# Patient Record
Sex: FEMALE | Race: BLACK OR AFRICAN AMERICAN | URBAN - NONMETROPOLITAN AREA
[De-identification: names, ages, dates, MRNs, and addresses within clinical notes are randomized per-mention and may not be internally consistent; named-entity substitution may affect disease eponyms.]

---

## 2023-05-04 ENCOUNTER — LAB OUTSIDE AN ENCOUNTER (OUTPATIENT)
Dept: URBAN - NONMETROPOLITAN AREA CLINIC 13 | Facility: CLINIC | Age: 43
End: 2023-05-04

## 2023-05-04 ENCOUNTER — TELEPHONE ENCOUNTER (OUTPATIENT)
Dept: URBAN - NONMETROPOLITAN AREA CLINIC 2 | Facility: CLINIC | Age: 43
End: 2023-05-04

## 2023-05-04 ENCOUNTER — WEB ENCOUNTER (OUTPATIENT)
Dept: URBAN - NONMETROPOLITAN AREA CLINIC 13 | Facility: CLINIC | Age: 43
End: 2023-05-04

## 2023-05-04 ENCOUNTER — OFFICE VISIT (OUTPATIENT)
Dept: URBAN - NONMETROPOLITAN AREA CLINIC 13 | Facility: CLINIC | Age: 43
End: 2023-05-04
Payer: COMMERCIAL

## 2023-05-04 VITALS
SYSTOLIC BLOOD PRESSURE: 118 MMHG | DIASTOLIC BLOOD PRESSURE: 67 MMHG | HEART RATE: 80 BPM | BODY MASS INDEX: 47.09 KG/M2 | HEIGHT: 66 IN | OXYGEN SATURATION: 99 % | WEIGHT: 293 LBS

## 2023-05-04 DIAGNOSIS — R10.11 RUQ ABDOMINAL PAIN: ICD-10-CM

## 2023-05-04 DIAGNOSIS — K59.09 OTHER CONSTIPATION: ICD-10-CM

## 2023-05-04 DIAGNOSIS — Z12.11 COLON CANCER SCREENING: ICD-10-CM

## 2023-05-04 PROCEDURE — 99204 OFFICE O/P NEW MOD 45 MIN: CPT | Performed by: INTERNAL MEDICINE

## 2023-05-04 RX ORDER — SODIUM, POTASSIUM,MAG SULFATES 17.5-3.13G
177ML SOLUTION, RECONSTITUTED, ORAL ORAL
Qty: 1 | Refills: 0 | OUTPATIENT
Start: 2023-05-04

## 2023-05-04 RX ORDER — LABETALOL HYDROCHLORIDE 200 MG/1
TABLET, FILM COATED ORAL
Qty: 180 TABLET | Status: ACTIVE | COMMUNITY

## 2023-05-04 RX ORDER — LISINOPRIL AND HYDROCHLOROTHIAZIDE 12.5; 1 MG/1; MG/1
TAKE 1 TABLET BY MOUTH EVERY DAY TABLET ORAL
Qty: 90 EACH | Refills: 2 | Status: ACTIVE | COMMUNITY

## 2023-05-04 RX ORDER — TRIAMCINOLONE ACETONIDE 1 MG/G
APPLY TWICE A DAY TO AFFECTED AREAS ON BODY M-F AS NEEDED FLARES. AVOID FACE CREAM TOPICAL
Qty: 454 GRAM | Refills: 0 | Status: ACTIVE | COMMUNITY

## 2023-05-04 RX ORDER — HYDROCODONE BITARTRATE AND ACETAMINOPHEN 7.5; 325 MG/1; MG/1
TABLET ORAL
Qty: 100 TABLET | Status: ACTIVE | COMMUNITY

## 2023-05-04 RX ORDER — PANTOPRAZOLE SODIUM 40 MG/1
TABLET, DELAYED RELEASE ORAL
Qty: 180 TABLET | Status: ACTIVE | COMMUNITY

## 2023-05-04 RX ORDER — POTASSIUM CHLORIDE 1500 MG/1
TAKE 1 TABLET BY MOUTH EVERY DAY TABLET, FILM COATED, EXTENDED RELEASE ORAL
Qty: 90 EACH | Refills: 0 | Status: ACTIVE | COMMUNITY

## 2023-05-04 RX ORDER — CYCLOBENZAPRINE HYDROCHLORIDE 10 MG/1
1 (ONE) TABLET DAILY AS NEEDED FOR SPASM TABLET, FILM COATED ORAL
Qty: 28 EACH | Refills: 0 | Status: ACTIVE | COMMUNITY

## 2023-05-04 RX ORDER — GABAPENTIN 300 MG/1
CAPSULE ORAL
Qty: 84 CAPSULE | Status: ACTIVE | COMMUNITY

## 2023-05-04 NOTE — HPI-TODAY'S VISIT:
Curt is a 43 year old  female who presents today for a "knot in her stomach". She has had RUQ pain since 2019. The pain is constant, and the "knot" appears underneath her right ribs intermittently. She had a cholecystectomy in April 2022 without any relief of her pain. She is currently taking pantoprazole 40 mg once daily, though it is prescribed BID. She reports significant constipation, only havng a bowel movement every 2-3 days. Some bright red blood when wiping. No nocturnal symptoms, unexplained weight loss, or family history of colon cancer. She had a CT in Novemeber with no acute findings.  LG.

## 2023-07-03 ENCOUNTER — TELEPHONE ENCOUNTER (OUTPATIENT)
Dept: URBAN - NONMETROPOLITAN AREA CLINIC 13 | Facility: CLINIC | Age: 43
End: 2023-07-03

## 2023-07-06 ENCOUNTER — TELEPHONE ENCOUNTER (OUTPATIENT)
Dept: URBAN - NONMETROPOLITAN AREA CLINIC 2 | Facility: CLINIC | Age: 43
End: 2023-07-06

## 2023-07-07 ENCOUNTER — OFFICE VISIT (OUTPATIENT)
Dept: URBAN - METROPOLITAN AREA MEDICAL CENTER 1 | Facility: MEDICAL CENTER | Age: 43
End: 2023-07-07
Payer: COMMERCIAL

## 2023-07-07 DIAGNOSIS — K92.1 HEMATOCHEZIA: ICD-10-CM

## 2023-07-07 DIAGNOSIS — R10.13 ABDOMINAL DISCOMFORT, EPIGASTRIC: ICD-10-CM

## 2023-07-07 PROCEDURE — 43235 EGD DIAGNOSTIC BRUSH WASH: CPT | Performed by: INTERNAL MEDICINE

## 2023-07-07 PROCEDURE — 45378 DIAGNOSTIC COLONOSCOPY: CPT | Performed by: INTERNAL MEDICINE

## 2023-08-24 ENCOUNTER — OFFICE VISIT (OUTPATIENT)
Dept: URBAN - NONMETROPOLITAN AREA CLINIC 13 | Facility: CLINIC | Age: 43
End: 2023-08-24
Payer: COMMERCIAL

## 2023-08-24 VITALS
HEIGHT: 66 IN | OXYGEN SATURATION: 100 % | WEIGHT: 293 LBS | SYSTOLIC BLOOD PRESSURE: 124 MMHG | BODY MASS INDEX: 47.09 KG/M2 | HEART RATE: 62 BPM | DIASTOLIC BLOOD PRESSURE: 77 MMHG

## 2023-08-24 DIAGNOSIS — K58.1 IRRITABLE BOWEL SYNDROME WITH CONSTIPATION: ICD-10-CM

## 2023-08-24 DIAGNOSIS — R10.11 RUQ ABDOMINAL PAIN: ICD-10-CM

## 2023-08-24 DIAGNOSIS — K59.09 OTHER CONSTIPATION: ICD-10-CM

## 2023-08-24 DIAGNOSIS — Z12.11 COLON CANCER SCREENING: ICD-10-CM

## 2023-08-24 PROBLEM — 305058001: Status: ACTIVE | Noted: 2023-05-04

## 2023-08-24 PROBLEM — 440630006: Status: ACTIVE | Noted: 2023-08-24

## 2023-08-24 PROCEDURE — 99213 OFFICE O/P EST LOW 20 MIN: CPT | Performed by: NURSE PRACTITIONER

## 2023-08-24 RX ORDER — HYDROCODONE BITARTRATE AND ACETAMINOPHEN 7.5; 325 MG/1; MG/1
TABLET ORAL
Qty: 100 TABLET | Status: ACTIVE | COMMUNITY

## 2023-08-24 RX ORDER — LINACLOTIDE 145 UG/1
1 CAPSULE AT LEAST 30 MINUTES BEFORE THE FIRST MEAL OF THE DAY ON AN EMPTY STOMACH CAPSULE, GELATIN COATED ORAL ONCE A DAY
Qty: 30 | Refills: 3 | OUTPATIENT
Start: 2023-08-24 | End: 2023-12-21

## 2023-08-24 RX ORDER — GABAPENTIN 300 MG/1
CAPSULE ORAL
Qty: 84 CAPSULE | Status: ACTIVE | COMMUNITY

## 2023-08-24 RX ORDER — LISINOPRIL AND HYDROCHLOROTHIAZIDE 12.5; 1 MG/1; MG/1
TAKE 1 TABLET BY MOUTH EVERY DAY TABLET ORAL
Qty: 90 EACH | Refills: 2 | Status: ACTIVE | COMMUNITY

## 2023-08-24 RX ORDER — IBUPROFEN 800 MG/1
TABLET, FILM COATED ORAL
Qty: 90 TABLET | Status: ACTIVE | COMMUNITY

## 2023-08-24 RX ORDER — POTASSIUM CHLORIDE 1500 MG/1
TAKE 1 TABLET BY MOUTH EVERY DAY TABLET, FILM COATED, EXTENDED RELEASE ORAL
Qty: 90 EACH | Refills: 0 | Status: ACTIVE | COMMUNITY

## 2023-08-24 RX ORDER — TRIAMCINOLONE ACETONIDE 1 MG/G
APPLY TWICE A DAY TO AFFECTED AREAS ON BODY M-F AS NEEDED FLARES. AVOID FACE CREAM TOPICAL
Qty: 454 GRAM | Refills: 0 | Status: ACTIVE | COMMUNITY

## 2023-08-24 RX ORDER — LABETALOL HYDROCHLORIDE 200 MG/1
TABLET, FILM COATED ORAL
Qty: 180 TABLET | Status: ACTIVE | COMMUNITY

## 2023-08-24 RX ORDER — PANTOPRAZOLE SODIUM 40 MG/1
TABLET, DELAYED RELEASE ORAL
Qty: 180 TABLET | Status: ACTIVE | COMMUNITY

## 2023-08-24 RX ORDER — FERROUS SULFATE 325(65) MG
1 TABLET TABLET ORAL
Status: ACTIVE | COMMUNITY

## 2023-08-24 RX ORDER — CYCLOBENZAPRINE HYDROCHLORIDE 10 MG/1
1 (ONE) TABLET DAILY AS NEEDED FOR SPASM TABLET, FILM COATED ORAL
Qty: 28 EACH | Refills: 0 | Status: ACTIVE | COMMUNITY

## 2023-08-24 NOTE — HPI-OTHER HISTORIES
5/4/2023 Curt is a 43 year old  female who presents today for a "knot in her stomach". She has had RUQ pain since 2019. The pain is constant, and the "knot" appears underneath her right ribs intermittently. She had a cholecystectomy in April 2022 without any relief of her pain. She is currently taking pantoprazole 40 mg once daily, though it is prescribed BID. She reports significant constipation, only havng a bowel movement every 2-3 days. Some bright red blood when wiping. No nocturnal symptoms, unexplained weight loss, or family history of colon cancer. She had a CT in Novemeber with no acute findings. LG.

## 2023-08-24 NOTE — HPI-TODAY'S VISIT:
Ms. Curt Roman returns for follow-up of a knot on her right side.  She underwent an upper endoscopy for further evaluation of this with entirely normal results.  She reports that her epigastric pain is improved but her right upper quadrant pain persists.  She also underwent routine colon cancer screening were colon was noted to have moderate excessive looping but was otherwise normal with recommendation to repeat in 10 years.  Curt has not experienced any further blood in her stool.  She continues to endorse constipation, reporting bowel movements every 2 to 3 days while using MiraLAX.  Today we discussed that she can stop pantoprazole as she has no symptoms of GERD.  We will begin Linzess for constipation she will hopefully improve her abdominal discomfort.  I discussed obtaining a gastric emptying study but Curt would like to wait to proceed with this until follow-up.  LG.

## 2023-11-21 ENCOUNTER — OFFICE VISIT (OUTPATIENT)
Dept: URBAN - NONMETROPOLITAN AREA CLINIC 13 | Facility: CLINIC | Age: 43
End: 2023-11-21

## 2023-11-22 ENCOUNTER — LAB OUTSIDE AN ENCOUNTER (OUTPATIENT)
Dept: URBAN - NONMETROPOLITAN AREA CLINIC 2 | Facility: CLINIC | Age: 43
End: 2023-11-22

## 2023-11-22 ENCOUNTER — OFFICE VISIT (OUTPATIENT)
Dept: URBAN - NONMETROPOLITAN AREA CLINIC 2 | Facility: CLINIC | Age: 43
End: 2023-11-22
Payer: COMMERCIAL

## 2023-11-22 DIAGNOSIS — R10.11 RUQ ABDOMINAL PAIN: ICD-10-CM

## 2023-11-22 DIAGNOSIS — Z12.11 COLON CANCER SCREENING: ICD-10-CM

## 2023-11-22 DIAGNOSIS — K58.1 IRRITABLE BOWEL SYNDROME WITH CONSTIPATION: ICD-10-CM

## 2023-11-22 DIAGNOSIS — K59.09 OTHER CONSTIPATION: ICD-10-CM

## 2023-11-22 PROCEDURE — 99442 PHONE E/M BY PHYS 11-20 MIN: CPT | Performed by: NURSE PRACTITIONER

## 2023-11-22 RX ORDER — LISINOPRIL AND HYDROCHLOROTHIAZIDE 12.5; 1 MG/1; MG/1
TAKE 1 TABLET BY MOUTH EVERY DAY TABLET ORAL
Qty: 90 EACH | Refills: 2 | Status: ACTIVE | COMMUNITY

## 2023-11-22 RX ORDER — PLECANATIDE 3 MG/1
1 TABLET TABLET ORAL ONCE A DAY
Qty: 30 | Refills: 3 | OUTPATIENT
Start: 2023-11-22 | End: 2024-03-21

## 2023-11-22 RX ORDER — LABETALOL HYDROCHLORIDE 200 MG/1
TABLET, FILM COATED ORAL
Qty: 180 TABLET | Status: ACTIVE | COMMUNITY

## 2023-11-22 RX ORDER — GABAPENTIN 300 MG/1
CAPSULE ORAL
Qty: 84 CAPSULE | Status: ACTIVE | COMMUNITY

## 2023-11-22 RX ORDER — PANTOPRAZOLE SODIUM 40 MG/1
TABLET, DELAYED RELEASE ORAL
Qty: 180 TABLET | Status: ACTIVE | COMMUNITY

## 2023-11-22 RX ORDER — IBUPROFEN 800 MG/1
TABLET, FILM COATED ORAL
Qty: 90 TABLET | Status: ACTIVE | COMMUNITY

## 2023-11-22 RX ORDER — CYCLOBENZAPRINE HYDROCHLORIDE 10 MG/1
1 (ONE) TABLET DAILY AS NEEDED FOR SPASM TABLET, FILM COATED ORAL
Qty: 28 EACH | Refills: 0 | Status: ACTIVE | COMMUNITY

## 2023-11-22 RX ORDER — HYDROCODONE BITARTRATE AND ACETAMINOPHEN 7.5; 325 MG/1; MG/1
TABLET ORAL
Qty: 100 TABLET | Status: ACTIVE | COMMUNITY

## 2023-11-22 RX ORDER — TRIAMCINOLONE ACETONIDE 1 MG/G
APPLY TWICE A DAY TO AFFECTED AREAS ON BODY M-F AS NEEDED FLARES. AVOID FACE CREAM TOPICAL
Qty: 454 GRAM | Refills: 0 | Status: ACTIVE | COMMUNITY

## 2023-11-22 RX ORDER — LINACLOTIDE 145 UG/1
1 CAPSULE AT LEAST 30 MINUTES BEFORE THE FIRST MEAL OF THE DAY ON AN EMPTY STOMACH CAPSULE, GELATIN COATED ORAL ONCE A DAY
Qty: 30 | Refills: 3 | Status: ACTIVE | COMMUNITY
Start: 2023-08-24 | End: 2023-12-21

## 2023-11-22 RX ORDER — POTASSIUM CHLORIDE 1500 MG/1
TAKE 1 TABLET BY MOUTH EVERY DAY TABLET, FILM COATED, EXTENDED RELEASE ORAL
Qty: 90 EACH | Refills: 0 | Status: ACTIVE | COMMUNITY

## 2023-11-22 RX ORDER — FERROUS SULFATE 325(65) MG
1 TABLET TABLET ORAL
Status: ACTIVE | COMMUNITY

## 2023-11-22 NOTE — HPI-TODAY'S VISIT:
Sheba Roman presents for follow-up of right upper quadrant discomfort and constipation.  Today she reports that the Linzess 145 mcg was working well.  She was having more consistent bowel movements and her abdominal pain had improved.  Unfortunately after few weeks of this medication she developed ankle swelling and bad headaches.  She subsequently stopped the medication and her side effects resolved.  She is wondering if there is an alternative medication that may improve her constipation and that her abdominal pain without the adverse effects.  Otherwise feels well and denies other GI complaints today.  LG.

## 2023-11-22 NOTE — HPI-OTHER HISTORIES
5/4/2023 Curt is a 43 year old  female who presents today for a "knot in her stomach". She has had RUQ pain since 2019. The pain is constant, and the "knot" appears underneath her right ribs intermittently. She had a cholecystectomy in April 2022 without any relief of her pain. She is currently taking pantoprazole 40 mg once daily, though it is prescribed BID. She reports significant constipation, only havng a bowel movement every 2-3 days. Some bright red blood when wiping. No nocturnal symptoms, unexplained weight loss, or family history of colon cancer. She had a CT in Novemeber with no acute findings. LG.  8/24/23: Ms. Curt Roman returns for follow-up of a knot on her right side. She underwent an upper endoscopy for further evaluation of this with entirely normal results. She reports that her epigastric pain is improved but her right upper quadrant pain persists. She also underwent routine colon cancer screening were colon was noted to have moderate excessive looping but was otherwise normal with recommendation to repeat in 10 years. Curt has not experienced any further blood in her stool. She continues to endorse constipation, reporting bowel movements every 2 to 3 days while using MiraLAX. Today we discussed that she can stop pantoprazole as she has no symptoms of GERD. We will begin Linzess for constipation she will hopefully improve her abdominal discomfort. I discussed obtaining a gastric emptying study but Curt would like to wait to proceed with this until follow-up. LG.

## 2024-01-11 ENCOUNTER — OFFICE VISIT (OUTPATIENT)
Dept: URBAN - NONMETROPOLITAN AREA CLINIC 13 | Facility: CLINIC | Age: 44
End: 2024-01-11
Payer: COMMERCIAL

## 2024-01-11 ENCOUNTER — DASHBOARD ENCOUNTERS (OUTPATIENT)
Age: 44
End: 2024-01-11

## 2024-01-11 VITALS
HEIGHT: 66 IN | HEART RATE: 68 BPM | SYSTOLIC BLOOD PRESSURE: 146 MMHG | DIASTOLIC BLOOD PRESSURE: 90 MMHG | BODY MASS INDEX: 47.09 KG/M2 | WEIGHT: 293 LBS

## 2024-01-11 DIAGNOSIS — K59.09 OTHER CONSTIPATION: ICD-10-CM

## 2024-01-11 DIAGNOSIS — R10.11 RUQ ABDOMINAL PAIN: ICD-10-CM

## 2024-01-11 DIAGNOSIS — K58.1 IRRITABLE BOWEL SYNDROME WITH CONSTIPATION: ICD-10-CM

## 2024-01-11 DIAGNOSIS — Z12.11 COLON CANCER SCREENING: ICD-10-CM

## 2024-01-11 PROBLEM — 14760008: Status: ACTIVE | Noted: 2023-05-04

## 2024-01-11 PROBLEM — 301717006: Status: ACTIVE | Noted: 2023-05-04

## 2024-01-11 PROCEDURE — 99214 OFFICE O/P EST MOD 30 MIN: CPT | Performed by: NURSE PRACTITIONER

## 2024-01-11 RX ORDER — LISINOPRIL AND HYDROCHLOROTHIAZIDE 12.5; 1 MG/1; MG/1
TAKE 1 TABLET BY MOUTH EVERY DAY TABLET ORAL
Qty: 90 EACH | Refills: 2 | Status: ACTIVE | COMMUNITY

## 2024-01-11 RX ORDER — TRIAMCINOLONE ACETONIDE 1 MG/G
APPLY TWICE A DAY TO AFFECTED AREAS ON BODY M-F AS NEEDED FLARES. AVOID FACE CREAM TOPICAL
Qty: 454 GRAM | Refills: 0 | Status: ACTIVE | COMMUNITY

## 2024-01-11 RX ORDER — HYOSCYAMINE SULFATE 0.12 MG/1
1 TABLET UNDER THE TONGUE AND ALLOW TO DISSOLVE AS NEEDED TABLET SUBLINGUAL THREE TIMES A DAY
Qty: 90 | Refills: 3 | OUTPATIENT
Start: 2024-01-11 | End: 2024-05-10

## 2024-01-11 RX ORDER — IBUPROFEN 800 MG/1
TABLET, FILM COATED ORAL
Qty: 90 TABLET | Status: ACTIVE | COMMUNITY

## 2024-01-11 RX ORDER — POTASSIUM CHLORIDE 1500 MG/1
TAKE 1 TABLET BY MOUTH EVERY DAY TABLET, FILM COATED, EXTENDED RELEASE ORAL
Qty: 90 EACH | Refills: 0 | Status: ACTIVE | COMMUNITY

## 2024-01-11 RX ORDER — LABETALOL HYDROCHLORIDE 200 MG/1
TABLET, FILM COATED ORAL
Qty: 180 TABLET | Status: ACTIVE | COMMUNITY

## 2024-01-11 RX ORDER — CYCLOBENZAPRINE HYDROCHLORIDE 10 MG/1
1 (ONE) TABLET DAILY AS NEEDED FOR SPASM TABLET, FILM COATED ORAL
Qty: 28 EACH | Refills: 0 | Status: ACTIVE | COMMUNITY

## 2024-01-11 RX ORDER — GABAPENTIN 300 MG/1
CAPSULE ORAL
Qty: 84 CAPSULE | Status: ACTIVE | COMMUNITY

## 2024-01-11 RX ORDER — HYDROCODONE BITARTRATE AND ACETAMINOPHEN 7.5; 325 MG/1; MG/1
TABLET ORAL
Qty: 100 TABLET | Status: ACTIVE | COMMUNITY

## 2024-01-11 RX ORDER — PANTOPRAZOLE SODIUM 40 MG/1
TABLET, DELAYED RELEASE ORAL
Qty: 180 TABLET | Status: ACTIVE | COMMUNITY

## 2024-01-11 RX ORDER — PLECANATIDE 3 MG/1
1 TABLET TABLET ORAL ONCE A DAY
Qty: 30 | Refills: 3 | Status: ACTIVE | COMMUNITY
Start: 2023-11-22 | End: 2024-03-21

## 2024-01-11 RX ORDER — FERROUS SULFATE 325(65) MG
1 TABLET TABLET ORAL
Status: ACTIVE | COMMUNITY

## 2024-01-11 RX ORDER — PLECANATIDE 3 MG/1
1 TABLET TABLET ORAL ONCE A DAY
Qty: 90 TABLET | Refills: 3 | OUTPATIENT

## 2024-01-11 NOTE — HPI-OTHER HISTORIES
5/4/2023 Curt is a 43 year old  female who presents today for a "knot in her stomach". She has had RUQ pain since 2019. The pain is constant, and the "knot" appears underneath her right ribs intermittently. She had a cholecystectomy in April 2022 without any relief of her pain. She is currently taking pantoprazole 40 mg once daily, though it is prescribed BID. She reports significant constipation, only havng a bowel movement every 2-3 days. Some bright red blood when wiping. No nocturnal symptoms, unexplained weight loss, or family history of colon cancer. She had a CT in Novemer with no acute findings. LG.  8/24/23: Ms. Curt Roman returns for follow-up of a knot on her right side. She underwent an upper endoscopy for further evaluation of this with entirely normal results. She reports that her epigastric pain is improved but her right upper quadrant pain persists. She also underwent routine colon cancer screening were colon was noted to have moderate excessive looping but was otherwise normal with recommendation to repeat in 10 years. Curt has not experienced any further blood in her stool. She continues to endorse constipation, reporting bowel movements every 2 to 3 days while using MiraLAX. Today we discussed that she can stop pantoprazole as she has no symptoms of GERD. We will begin Linzess for constipation she will hopefully improve her abdominal discomfort. I discussed obtaining a gastric emptying study but Curt would like to wait to proceed with this until follow-up. LG.  11/2023:  Sheba Roman presents for follow-up of right upper quadrant discomfort and constipation. Today she reports that the Linzess 145 mcg was working well. She was having more consistent bowel movements and her abdominal pain had improved. Unfortunately after few weeks of this medication she developed ankle swelling and bad headaches. She subsequently stopped the medication and her side effects resolved. She is wondering if there is an alternative medication that may improve her constipation and that her abdominal pain without the adverse effects. Otherwise feels well and denies other GI complaints today. LG.

## 2024-01-11 NOTE — HPI-TODAY'S VISIT:
Sheba presents for f/u of constipation and RUQ pain. Since last visit she had gastric emptying study performed that showed rapid gastric emptying. Today she reports that she was unable to try the trulance as she had stressors at home that made her want to avoid taking medications. She is interested in trying this medication again. She is currently having bm every 3-4 days, with associated cramping that improves after BM. Denies any hematochezia or melena. Also denies any n/v/d.  LG.

## 2024-04-18 ENCOUNTER — OV EP (OUTPATIENT)
Dept: URBAN - NONMETROPOLITAN AREA CLINIC 13 | Facility: CLINIC | Age: 44
End: 2024-04-18